# Patient Record
Sex: FEMALE | Race: OTHER | NOT HISPANIC OR LATINO
[De-identification: names, ages, dates, MRNs, and addresses within clinical notes are randomized per-mention and may not be internally consistent; named-entity substitution may affect disease eponyms.]

---

## 2018-04-30 PROBLEM — Z00.00 ENCOUNTER FOR PREVENTIVE HEALTH EXAMINATION: Status: ACTIVE | Noted: 2018-04-30

## 2018-05-18 ENCOUNTER — APPOINTMENT (OUTPATIENT)
Dept: NEUROSURGERY | Facility: CLINIC | Age: 53
End: 2018-05-18
Payer: MEDICARE

## 2018-05-18 VITALS
BODY MASS INDEX: 29.09 KG/M2 | SYSTOLIC BLOOD PRESSURE: 96 MMHG | WEIGHT: 181 LBS | OXYGEN SATURATION: 98 % | HEIGHT: 66 IN | HEART RATE: 73 BPM | DIASTOLIC BLOOD PRESSURE: 66 MMHG | TEMPERATURE: 97.8 F

## 2018-05-18 DIAGNOSIS — Z82.49 FAMILY HISTORY OF ISCHEMIC HEART DISEASE AND OTHER DISEASES OF THE CIRCULATORY SYSTEM: ICD-10-CM

## 2018-05-18 DIAGNOSIS — Z78.9 OTHER SPECIFIED HEALTH STATUS: ICD-10-CM

## 2018-05-18 DIAGNOSIS — E22.9 HYPERFUNCTION OF PITUITARY GLAND, UNSPECIFIED: ICD-10-CM

## 2018-05-18 DIAGNOSIS — J30.2 OTHER SEASONAL ALLERGIC RHINITIS: ICD-10-CM

## 2018-05-18 DIAGNOSIS — F31.9 BIPOLAR DISORDER, UNSPECIFIED: ICD-10-CM

## 2018-05-18 DIAGNOSIS — G91.9 HYDROCEPHALUS, UNSPECIFIED: ICD-10-CM

## 2018-05-18 DIAGNOSIS — Z82.61 FAMILY HISTORY OF ARTHRITIS: ICD-10-CM

## 2018-05-18 PROCEDURE — 99204 OFFICE O/P NEW MOD 45 MIN: CPT

## 2018-05-18 RX ORDER — PALIPERIDONE PALMITATE 117 MG/.75ML
117 INJECTION INTRAMUSCULAR
Refills: 0 | Status: ACTIVE | COMMUNITY

## 2018-05-18 RX ORDER — FLUTICASONE FUROATE AND VILANTEROL TRIFENATATE 100; 25 UG/1; UG/1
100-25 POWDER RESPIRATORY (INHALATION)
Refills: 0 | Status: ACTIVE | COMMUNITY

## 2018-05-18 RX ORDER — LISINOPRIL AND HYDROCHLOROTHIAZIDE TABLETS 20; 25 MG/1; MG/1
20-25 TABLET ORAL
Refills: 0 | Status: ACTIVE | COMMUNITY

## 2018-07-23 PROBLEM — J30.2 SEASONAL ALLERGIES: Status: ACTIVE | Noted: 2018-05-18

## 2018-08-13 ENCOUNTER — APPOINTMENT (OUTPATIENT)
Dept: NEUROSURGERY | Facility: CLINIC | Age: 53
End: 2018-08-13
Payer: MEDICARE

## 2018-08-13 VITALS
TEMPERATURE: 98.4 F | SYSTOLIC BLOOD PRESSURE: 99 MMHG | OXYGEN SATURATION: 98 % | RESPIRATION RATE: 18 BRPM | HEIGHT: 66 IN | BODY MASS INDEX: 28.93 KG/M2 | HEART RATE: 66 BPM | WEIGHT: 180 LBS | DIASTOLIC BLOOD PRESSURE: 64 MMHG

## 2018-08-13 PROCEDURE — 99214 OFFICE O/P EST MOD 30 MIN: CPT

## 2019-08-09 ENCOUNTER — APPOINTMENT (OUTPATIENT)
Dept: NEUROSURGERY | Facility: CLINIC | Age: 54
End: 2019-08-09

## 2019-09-12 NOTE — PHYSICAL EXAM
[General Appearance - Alert] : alert [General Appearance - In No Acute Distress] : in no acute distress [Person] : oriented to person [Place] : oriented to place [Time] : oriented to time [Cranial Nerves Optic (II)] : visual acuity intact bilaterally,  pupils equal round and reactive to light [Cranial Nerves Oculomotor (III)] : extraocular motion intact [Cranial Nerves Trigeminal (V)] : facial sensation intact symmetrically [Cranial Nerves Facial (VII)] : face symmetrical [Cranial Nerves Vestibulocochlear (VIII)] : hearing was intact bilaterally [Cranial Nerves Glossopharyngeal (IX)] : tongue and palate midline [Cranial Nerves Accessory (XI - Cranial And Spinal)] : head turning and shoulder shrug symmetric [Cranial Nerves Hypoglossal (XII)] : there was no tongue deviation with protrusion [Motor Tone] : muscle tone was normal in all four extremities [Motor Strength] : muscle strength was normal in all four extremities [Balance] : balance was intact [Extraocular Movements] : extraocular movements were intact [Full Visual Field] : full visual field [Neck Appearance] : the appearance of the neck was normal [] : no respiratory distress [Abnormal Walk] : normal gait

## 2019-09-13 ENCOUNTER — APPOINTMENT (OUTPATIENT)
Dept: NEUROSURGERY | Facility: CLINIC | Age: 54
End: 2019-09-13
Payer: MEDICARE

## 2019-09-13 ENCOUNTER — APPOINTMENT (OUTPATIENT)
Dept: MRI IMAGING | Facility: HOSPITAL | Age: 54
End: 2019-09-13
Payer: MEDICARE

## 2019-09-13 ENCOUNTER — OUTPATIENT (OUTPATIENT)
Dept: OUTPATIENT SERVICES | Facility: HOSPITAL | Age: 54
LOS: 1 days | End: 2019-09-13
Payer: MEDICARE

## 2019-09-13 VITALS
WEIGHT: 180 LBS | TEMPERATURE: 98 F | DIASTOLIC BLOOD PRESSURE: 78 MMHG | OXYGEN SATURATION: 99 % | SYSTOLIC BLOOD PRESSURE: 125 MMHG | RESPIRATION RATE: 18 BRPM | HEIGHT: 66 IN | BODY MASS INDEX: 28.93 KG/M2 | HEART RATE: 67 BPM

## 2019-09-13 PROCEDURE — 99214 OFFICE O/P EST MOD 30 MIN: CPT

## 2019-09-13 PROCEDURE — 70553 MRI BRAIN STEM W/O & W/DYE: CPT | Mod: 26

## 2019-09-13 PROCEDURE — 70553 MRI BRAIN STEM W/O & W/DYE: CPT

## 2019-09-13 PROCEDURE — A9585: CPT

## 2019-09-13 RX ORDER — ASPIRIN 81 MG
81 TABLET, DELAYED RELEASE (ENTERIC COATED) ORAL
Refills: 0 | Status: ACTIVE | COMMUNITY

## 2019-09-13 RX ORDER — BROMOCRIPTINE MESYLATE 2.5 MG/1
2.5 TABLET ORAL
Refills: 0 | Status: DISCONTINUED | COMMUNITY
End: 2019-09-13

## 2019-09-13 RX ORDER — CALCIUM CARBONATE 260MG(650)
500 TABLET,CHEWABLE ORAL
Refills: 0 | Status: ACTIVE | COMMUNITY

## 2019-09-13 RX ORDER — FERROUS GLUCONATE 240(27)MG
TABLET ORAL
Refills: 0 | Status: ACTIVE | COMMUNITY

## 2019-09-13 RX ORDER — DILTIAZEM HYDROCHLORIDE 60 MG/1
60 TABLET ORAL
Refills: 0 | Status: ACTIVE | COMMUNITY

## 2019-09-13 NOTE — HISTORY OF PRESENT ILLNESS
[FreeTextEntry1] : elevated prolactin [de-identified] : \par She reports she had complaints of dizziness, blurred vision, migraines. SHe went to her PCP who ordered labs and noticed an elevated PRL.  An MRI was then ordered that revealed a possible adenoma.\par \par She is on carbergoline 0.5mg three times weekly.  Her last prolactin was 23 7/2019\par \par Presents to review her imaging and discuss a treatment/follow up plan.

## 2019-09-13 NOTE — ASSESSMENT
[FreeTextEntry1] : Patient is a follow up case of pituitary adenoma on dopamine agonists. There is no significant increase in size of her tumor on serial MRI imaging. Patient has been counselled regarding the role of medical management in her disease considering the stable course of her tumor in absence of any active symptoms. She has also been advised to report back urgently in case she notices any symptoms of vision deterioration, persistent headache etc. She should continue to follow with her endocrinologist Dr. Hinton and her ophthalmologist Dr. Oleary.  I had a long discussion with the patient regarding the role of serial imaging with a new MRI in Fall 2020.  If the tumor grows or becomes symptomatic, we plan for surgical excision accordingly. Therapeutic and diagnostic tests include MRI sella in Fall 2020.  The patient should see me back in Fall 2020. I have explained the alternatives, risks and benefits to the patient and he understands and agrees to proceed. \par \par

## 2019-09-13 NOTE — DATA REVIEWED
[de-identified] : I have reviewed the most recent MRI which shows stability of a cystic pituitary lesion

## 2021-01-15 ENCOUNTER — APPOINTMENT (OUTPATIENT)
Dept: NEUROSURGERY | Facility: CLINIC | Age: 56
End: 2021-01-15
Payer: MEDICARE

## 2021-01-15 ENCOUNTER — OUTPATIENT (OUTPATIENT)
Dept: OUTPATIENT SERVICES | Facility: HOSPITAL | Age: 56
LOS: 1 days | End: 2021-01-15
Payer: MEDICARE

## 2021-01-15 ENCOUNTER — APPOINTMENT (OUTPATIENT)
Dept: MRI IMAGING | Facility: HOSPITAL | Age: 56
End: 2021-01-15

## 2021-01-15 VITALS
DIASTOLIC BLOOD PRESSURE: 85 MMHG | BODY MASS INDEX: 32.14 KG/M2 | RESPIRATION RATE: 18 BRPM | TEMPERATURE: 94.4 F | HEART RATE: 87 BPM | WEIGHT: 200 LBS | SYSTOLIC BLOOD PRESSURE: 145 MMHG | OXYGEN SATURATION: 98 % | HEIGHT: 66 IN

## 2021-01-15 PROCEDURE — A9585: CPT

## 2021-01-15 PROCEDURE — 70553 MRI BRAIN STEM W/O & W/DYE: CPT

## 2021-01-15 PROCEDURE — 70553 MRI BRAIN STEM W/O & W/DYE: CPT | Mod: 26

## 2021-01-15 PROCEDURE — 99214 OFFICE O/P EST MOD 30 MIN: CPT

## 2021-01-15 PROCEDURE — 99072 ADDL SUPL MATRL&STAF TM PHE: CPT

## 2021-01-15 RX ORDER — TOPIRAMATE 25 MG/1
25 TABLET, FILM COATED ORAL
Refills: 0 | Status: ACTIVE | COMMUNITY

## 2021-01-15 NOTE — PHYSICAL EXAM
[General Appearance - Alert] : alert [General Appearance - In No Acute Distress] : in no acute distress [Person] : oriented to person [Place] : oriented to place [Time] : oriented to time [Cranial Nerves Optic (II)] : visual acuity intact bilaterally,  pupils equal round and reactive to light [Cranial Nerves Oculomotor (III)] : extraocular motion intact [Cranial Nerves Trigeminal (V)] : facial sensation intact symmetrically [Cranial Nerves Facial (VII)] : face symmetrical [Cranial Nerves Vestibulocochlear (VIII)] : hearing was intact bilaterally [Cranial Nerves Glossopharyngeal (IX)] : tongue and palate midline [Cranial Nerves Accessory (XI - Cranial And Spinal)] : head turning and shoulder shrug symmetric [Cranial Nerves Hypoglossal (XII)] : there was no tongue deviation with protrusion [Motor Tone] : muscle tone was normal in all four extremities [Motor Strength] : muscle strength was normal in all four extremities [Abnormal Walk] : normal gait [Balance] : balance was intact [PERRL With Normal Accommodation] : pupils were equal in size, round, reactive to light, with normal accommodation [Extraocular Movements] : extraocular movements were intact [Full Visual Field] : full visual field [Outer Ear] : the ears and nose were normal in appearance [Neck Appearance] : the appearance of the neck was normal [] : no respiratory distress

## 2021-01-21 NOTE — HISTORY OF PRESENT ILLNESS
[> 3 months] : more  than 3 months [de-identified] : \par She initially presented with complaints of dizziness, blurred vision, migraines. SHe went to her PCP who ordered labs and noticed an elevated PRL.  An MRI was then ordered that revealed a possible adenoma.\par \par She is on carbergoline 0.5mg three times weekly.  Per Dr. Hinton's note her last prolactin was 39.5.\par  Her PRL was as high as 115.32 on 9/18/2017\par \par Presents to review her imaging and discuss a treatment/follow up plan. She has had some headaches lately which have been treated with topamax.

## 2021-01-21 NOTE — ASSESSMENT
[FreeTextEntry1] : Patient is a follow up case of pituitary adenoma on dopamine agonists. There is no significant increase in size of her tumor on serial MRI imaging. Patient has been counselled regarding the role of medical management in her disease considering the stable course of her tumor in absence of any active symptoms. She has also been advised to report back urgently in case she notices any symptoms of vision deterioration, persistent headache etc. She should continue to follow with her endocrinologist Dr. Hinton and her ophthalmologist Dr. Oleary.  I had a long discussion with the patient regarding the role of serial imaging with a new MRI in January 2022.  .  The patient should see me back in January 2022. I have explained the alternatives, risks and benefits to the patient and she understands and agrees to proceed. \par \par

## 2021-08-13 ENCOUNTER — APPOINTMENT (OUTPATIENT)
Dept: SURGERY | Facility: CLINIC | Age: 56
End: 2021-08-13

## 2022-07-22 ENCOUNTER — APPOINTMENT (OUTPATIENT)
Dept: MRI IMAGING | Facility: HOSPITAL | Age: 57
End: 2022-07-22

## 2022-08-08 ENCOUNTER — APPOINTMENT (OUTPATIENT)
Dept: NEUROSURGERY | Facility: CLINIC | Age: 57
End: 2022-08-08

## 2022-08-23 ENCOUNTER — TRANSCRIPTION ENCOUNTER (OUTPATIENT)
Age: 57
End: 2022-08-23

## 2022-08-23 ENCOUNTER — APPOINTMENT (OUTPATIENT)
Dept: MRI IMAGING | Facility: HOSPITAL | Age: 57
End: 2022-08-23

## 2022-08-23 ENCOUNTER — OUTPATIENT (OUTPATIENT)
Dept: OUTPATIENT SERVICES | Facility: HOSPITAL | Age: 57
LOS: 1 days | End: 2022-08-23
Payer: MEDICARE

## 2022-08-23 PROCEDURE — 70553 MRI BRAIN STEM W/O & W/DYE: CPT | Mod: 26

## 2022-08-23 PROCEDURE — 70553 MRI BRAIN STEM W/O & W/DYE: CPT

## 2022-08-23 PROCEDURE — A9585: CPT

## 2022-08-29 ENCOUNTER — APPOINTMENT (OUTPATIENT)
Dept: NEUROSURGERY | Facility: CLINIC | Age: 57
End: 2022-08-29

## 2022-08-29 PROCEDURE — 99205 OFFICE O/P NEW HI 60 MIN: CPT | Mod: 95

## 2022-08-29 NOTE — ASSESSMENT
[FreeTextEntry1] : Patient is a follow up case of pituitary adenoma on dopamine agonists. There is no significant increase in size of her tumor on serial MRI imaging when compared to last year, there may be slight growth compared to 2019. Patient has been counselled regarding the role of medical management in her disease considering the stable course of her tumor in absence of any active symptoms. Her prolactin levels may be falsely elevated due to trazodone uses. She has also been advised to report back urgently in case she notices any symptoms of vision deterioration, persistent headache etc. She should continue to follow with her endocrinologist Dr. Hinton and her ophthalmologist Dr. Oleary.  I had a long discussion with the patient regarding the role of serial imaging with a new MRI sella with and without contrast in April 2023 and with new endocrine labs at that time.  The patient should see me back in April 2023. I have explained the alternatives, risks and benefits to the patient and she understands and agrees to proceed. \par \par

## 2022-08-29 NOTE — REASON FOR VISIT
[Follow-Up: _____] : a [unfilled] follow-up visit [Home] : at home, [unfilled] , at the time of the visit. [Medical Office: ([facility])___] : at the medical office located in  [FreeTextEntry1] : new MRI

## 2022-08-29 NOTE — DATA REVIEWED
[de-identified] : I have reviewed the most recent MRI 8/23/22 at [facility] which shows no significant change to the size of her known pituitary adenoma when compared to 2021.

## 2022-08-29 NOTE — HISTORY OF PRESENT ILLNESS
[> 3 months] : more  than 3 months [de-identified] : \par She initially presented with complaints of dizziness, blurred vision, migraines. SHe went to her PCP who ordered labs and noticed an elevated PRL.  An MRI was then ordered that revealed a possible adenoma.\par \par She is on carbergoline 0.5mg three times weekly.  Per Dr. Hinton's note her last prolactin was 82.\par  Her PRL was as high as 115.32 on 9/18/2017\par \par Presents to review her imaging and discuss a treatment/follow up plan. She has had some headaches lately which have been treated with topamax. \par She denies any leaking from her breasts. \par She was recently started on Trazodone for her bipolar disorder.

## 2023-04-14 ENCOUNTER — APPOINTMENT (OUTPATIENT)
Dept: MRI IMAGING | Facility: HOSPITAL | Age: 58
End: 2023-04-14
Payer: MEDICARE

## 2023-04-14 ENCOUNTER — APPOINTMENT (OUTPATIENT)
Dept: NEUROSURGERY | Facility: CLINIC | Age: 58
End: 2023-04-14
Payer: MEDICARE

## 2023-04-14 ENCOUNTER — OUTPATIENT (OUTPATIENT)
Dept: OUTPATIENT SERVICES | Facility: HOSPITAL | Age: 58
LOS: 1 days | End: 2023-04-14
Payer: MEDICARE

## 2023-04-14 ENCOUNTER — NON-APPOINTMENT (OUTPATIENT)
Age: 58
End: 2023-04-14

## 2023-04-14 ENCOUNTER — RESULT REVIEW (OUTPATIENT)
Age: 58
End: 2023-04-14

## 2023-04-14 VITALS
SYSTOLIC BLOOD PRESSURE: 94 MMHG | RESPIRATION RATE: 17 BRPM | DIASTOLIC BLOOD PRESSURE: 62 MMHG | OXYGEN SATURATION: 99 % | BODY MASS INDEX: 29.41 KG/M2 | HEIGHT: 66 IN | TEMPERATURE: 96.7 F | WEIGHT: 183 LBS | HEART RATE: 75 BPM

## 2023-04-14 PROCEDURE — 70553 MRI BRAIN STEM W/O & W/DYE: CPT

## 2023-04-14 PROCEDURE — A9585: CPT

## 2023-04-14 PROCEDURE — 70553 MRI BRAIN STEM W/O & W/DYE: CPT | Mod: 26

## 2023-04-17 ENCOUNTER — APPOINTMENT (OUTPATIENT)
Dept: NEUROSURGERY | Facility: CLINIC | Age: 58
End: 2023-04-17
Payer: MEDICARE

## 2023-04-17 PROCEDURE — 99214 OFFICE O/P EST MOD 30 MIN: CPT | Mod: 95

## 2023-04-17 NOTE — ASSESSMENT
[FreeTextEntry1] : Patient is a follow up case of pituitary adenoma on dopamine agonists. There is no significant increase in size of her tumor on serial MRI imaging when compared to last year, there may be slight growth compared to 2019. Patient has been counselled regarding the role of medical management in her disease considering the stable course of her tumor in absence of any active symptoms. Her prolactin levels may be falsely elevated due to trazodone uses. She has also been advised to report back urgently in case she notices any symptoms of vision deterioration, persistent headache etc. She should continue to follow with her endocrinologist Dr. Hinton and her ophthalmologist Dr. Oleary.  I had a long discussion with the patient regarding the role of serial imaging with a new MRI sella with and without contrast in April 2024 and with new endocrine labs at that time.  The patient should see me back in April 2024. I have explained the alternatives, risks and benefits to the patient and she understands and agrees to proceed. \par \par

## 2023-04-17 NOTE — HISTORY OF PRESENT ILLNESS
[de-identified] : \par She initially presented with complaints of dizziness, blurred vision, migraines. SHe went to her PCP who ordered labs and noticed an elevated PRL.  An MRI was then ordered that revealed a possible adenoma.\par \par She is on cabergoline 0.5mg three times weekly.  Per Dr. Hinton's note her last prolactin was 82.\par  Her PRL was as high as 115.32 on 9/18/2017\par \par Presents to review her imaging and discuss a treatment/follow up plan. She has had some headaches lately which have been treated with topamax. \par She denies any leaking from her breasts. \par She was started on Trazodone for her bipolar disorder. \par \par Recent labs 3/3/23\par PRL 46.5

## 2023-04-17 NOTE — REASON FOR VISIT
[Home] : at home, [unfilled] , at the time of the visit. [Medical Office: ([facility])___] : at the medical office located in  [Patient] : the patient [FreeTextEntry1] : new MRI

## 2023-04-17 NOTE — DATA REVIEWED
[de-identified] : I have reviewed the most recent MRI 4/14/23 at [facility] which shows no evidence of recurrent or residual adenoma

## 2024-06-11 ENCOUNTER — APPOINTMENT (OUTPATIENT)
Dept: MRI IMAGING | Facility: HOSPITAL | Age: 59
End: 2024-06-11

## 2024-06-11 ENCOUNTER — OUTPATIENT (OUTPATIENT)
Dept: OUTPATIENT SERVICES | Facility: HOSPITAL | Age: 59
LOS: 1 days | End: 2024-06-11
Payer: MEDICARE

## 2024-06-11 PROCEDURE — 70553 MRI BRAIN STEM W/O & W/DYE: CPT

## 2024-06-11 PROCEDURE — A9585: CPT

## 2024-06-11 PROCEDURE — 70553 MRI BRAIN STEM W/O & W/DYE: CPT | Mod: 26

## 2024-06-14 ENCOUNTER — APPOINTMENT (OUTPATIENT)
Dept: NEUROSURGERY | Facility: CLINIC | Age: 59
End: 2024-06-14
Payer: MEDICARE

## 2024-06-14 VITALS
HEIGHT: 66 IN | DIASTOLIC BLOOD PRESSURE: 74 MMHG | HEART RATE: 71 BPM | SYSTOLIC BLOOD PRESSURE: 109 MMHG | BODY MASS INDEX: 29.41 KG/M2 | RESPIRATION RATE: 18 BRPM | OXYGEN SATURATION: 98 % | WEIGHT: 183 LBS

## 2024-06-14 DIAGNOSIS — Z98.2 PRESENCE OF CEREBROSPINAL FLUID DRAINAGE DEVICE: ICD-10-CM

## 2024-06-14 DIAGNOSIS — D35.2 BENIGN NEOPLASM OF PITUITARY GLAND: ICD-10-CM

## 2024-06-14 PROCEDURE — 99213 OFFICE O/P EST LOW 20 MIN: CPT

## 2024-06-16 PROBLEM — D35.2 PITUITARY ADENOMA: Status: ACTIVE | Noted: 2018-05-18

## 2024-06-16 PROBLEM — Z98.2 S/P VP SHUNT: Status: ACTIVE | Noted: 2018-05-18

## 2024-06-16 RX ORDER — MONTELUKAST 10 MG/1
10 TABLET, FILM COATED ORAL
Refills: 0 | Status: ACTIVE | COMMUNITY

## 2024-06-16 RX ORDER — IRON HEME POLYPEPTIDE/FOLIC AC 12-1MG
125 MCG TABLET ORAL
Refills: 0 | Status: DISCONTINUED | COMMUNITY
End: 2024-06-16

## 2024-06-16 RX ORDER — RIFAXIMIN 550 MG/1
550 TABLET ORAL
Refills: 0 | Status: ACTIVE | COMMUNITY

## 2024-06-16 RX ORDER — TRAZODONE HYDROCHLORIDE 50 MG/1
50 TABLET ORAL
Refills: 0 | Status: ACTIVE | COMMUNITY

## 2024-06-16 RX ORDER — ICOSAPENT ETHYL 1000 MG/1
1 CAPSULE ORAL
Refills: 0 | Status: ACTIVE | COMMUNITY

## 2024-06-16 RX ORDER — CYANOCOBALAMIN 1000 UG/ML
1000 INJECTION INTRAMUSCULAR; SUBCUTANEOUS
Refills: 0 | Status: ACTIVE | COMMUNITY

## 2024-06-16 RX ORDER — ASCORBIC ACID 500 MG
500 TABLET ORAL
Refills: 0 | Status: ACTIVE | COMMUNITY

## 2024-06-16 RX ORDER — MECLIZINE HYDROCHLORIDE 25 MG/1
25 TABLET ORAL
Refills: 0 | Status: DISCONTINUED | COMMUNITY
End: 2024-06-16

## 2024-06-16 RX ORDER — DENOSUMAB 60 MG/ML
60 INJECTION SUBCUTANEOUS
Refills: 0 | Status: ACTIVE | COMMUNITY

## 2024-06-16 RX ORDER — CALCIUM CARBONATE 260MG(650)
500 TABLET,CHEWABLE ORAL
Refills: 0 | Status: ACTIVE | COMMUNITY

## 2024-06-16 RX ORDER — ALBUTEROL 90 MCG
90 AEROSOL (GRAM) INHALATION
Refills: 0 | Status: DISCONTINUED | COMMUNITY
End: 2024-06-16

## 2024-06-16 RX ORDER — LACTULOSE 10 G/15ML
10 SOLUTION ORAL
Refills: 0 | Status: ACTIVE | COMMUNITY

## 2024-06-16 RX ORDER — SPIRONOLACTONE 25 MG/1
25 TABLET ORAL
Refills: 0 | Status: ACTIVE | COMMUNITY

## 2024-06-16 RX ORDER — B-COMPLEX WITH VITAMIN C
TABLET ORAL
Refills: 0 | Status: DISCONTINUED | COMMUNITY
End: 2024-06-16

## 2024-06-16 RX ORDER — IBANDRONATE SODIUM 150 MG/1
150 TABLET ORAL
Refills: 0 | Status: ACTIVE | COMMUNITY

## 2024-06-16 RX ORDER — CABERGOLINE 0.5 MG/1
0.5 TABLET ORAL 3 TIMES DAILY
Refills: 0 | Status: ACTIVE | COMMUNITY

## 2024-06-16 RX ORDER — DIVALPROEX SODIUM 250 MG/1
250 TABLET, DELAYED RELEASE ORAL
Refills: 0 | Status: DISCONTINUED | COMMUNITY
End: 2024-06-16

## 2024-06-16 RX ORDER — ALBUTEROL 90 MCG
90 AEROSOL (GRAM) INHALATION
Refills: 0 | Status: ACTIVE | COMMUNITY

## 2024-06-16 RX ORDER — CHOLECALCIFEROL (VITAMIN D3) 125 MCG
TABLET ORAL
Refills: 0 | Status: ACTIVE | COMMUNITY

## 2024-06-16 RX ORDER — TRAZODONE HYDROCHLORIDE 100 MG/1
100 TABLET ORAL
Refills: 0 | Status: DISCONTINUED | COMMUNITY
End: 2024-06-16

## 2024-06-16 RX ORDER — RIMEGEPANT SULFATE 75 MG/75MG
75 TABLET, ORALLY DISINTEGRATING ORAL
Refills: 0 | Status: ACTIVE | COMMUNITY

## 2024-06-16 RX ORDER — ATORVASTATIN CALCIUM 20 MG/1
20 TABLET, FILM COATED ORAL
Refills: 0 | Status: ACTIVE | COMMUNITY

## 2024-06-16 RX ORDER — HYDROXYCHLOROQUINE SULFATE 200 MG/1
200 TABLET, FILM COATED ORAL
Refills: 0 | Status: ACTIVE | COMMUNITY

## 2024-06-16 NOTE — DATA REVIEWED
[de-identified] : I have reviewed the most recent MRI on 6/11/24 at [facility] which shows Area of decreased enhancement involving the left inferior aspect of the pituitary gland again seen. This best seen on series 601 image 7 and measures approximately 5.4 x 5.1 mm.

## 2024-06-16 NOTE — PHYSICAL EXAM
[General Appearance - Alert] : alert [General Appearance - In No Acute Distress] : in no acute distress [Oriented To Time, Place, And Person] : oriented to person, place, and time [Person] : oriented to person [Place] : oriented to place [Time] : oriented to time [Cranial Nerves Optic (II)] : visual acuity intact bilaterally,  pupils equal round and reactive to light [Cranial Nerves Oculomotor (III)] : extraocular motion intact [Cranial Nerves Trigeminal (V)] : facial sensation intact symmetrically [Cranial Nerves Facial (VII)] : face symmetrical [Cranial Nerves Vestibulocochlear (VIII)] : hearing was intact bilaterally [Cranial Nerves Glossopharyngeal (IX)] : tongue and palate midline [Cranial Nerves Accessory (XI - Cranial And Spinal)] : head turning and shoulder shrug symmetric [Cranial Nerves Hypoglossal (XII)] : there was no tongue deviation with protrusion [FreeTextEntry1] : walks with cane as of 2009 metal yanna in Right leg

## 2024-06-16 NOTE — HISTORY OF PRESENT ILLNESS
[> 3 months] : more  than 3 months [de-identified] : DANILO NAVARRO is a female with hx of congenital hydrocephalus with  shunt placed 1993 by  at [facility]. She initially presented with complaints of dizziness, blurred vision, migraines. She went to her PCP who ordered labs and noticed an elevated PRL.  An MRI was then ordered that revealed a possible adenoma. DIagnised with Prolactinoma.  She is on cabergoline 0.5mg three times weekly.  Per Dr. Hinton's note her last prolactin was 82.  Her PRL was as high as 115.32 on 9/18/2017  She was started on Trazodone for her bipolar disorder.   Recent labs 3/3/23 PRL 46.5  Labs 5/9/24 PRL  36.1 on cabergoline 0.5mg   PCP Dr.Natalia Toro Neurologist:  on [facility] Endocrinology: Dr. Shahid Hinton  TODAY 6/14/24- presents for MRI review. Awaiting labwork from Dr.Jaldeep Hinton office for recent prolactin level. Last ophtho visit: [FreeTextEntry1] : DANILO NAVARRO is a female with hx of congenital hydrocephalus with  shunt placed 1993 by  at [facility]. She initially presented with complaints of dizziness, blurred vision, migraines. She went to her PCP who ordered labs and noticed an elevated PRL.  An MRI was then ordered that revealed a possible adenoma. DIagnised with Prolactinoma.  She is on cabergoline 0.5mg three times weekly.  Per Dr. Hinton's note her last prolactin was 82.  Her PRL was as high as 115.32 on 9/18/2017  She was started on Trazodone for her bipolar disorder.   Recent labs 3/3/23 PRL 46.5  Labs 5/9/24 PRL  36.1 on cabergoline 0.5mg    TODAY 6/14/24- presents for MRI review.  Denies abnormal growth of hands/feet, breast leaking, abnormal growth of facial features, significant unexplained weight loss/gain, sudden or peripheral vision loss and abnormal hair growth/loss, excessive thirst or urination, or excessive fatigue. Denies headache, changes in speech, nausea/vomiting, numbness/tingling/weakness in extremities, abnormal movement of extremities. Patient walks with a cane for support had right leg metal yanna 2009.   PCP Dr.Natalia Toro Neurologist:  on [facility] Endocrinology: Dr. Shahid Hinton

## 2024-06-16 NOTE — ASSESSMENT
[FreeTextEntry1] :  My impression is that the patient suffers from prolactinoma that is stable.   I had a long discussion with the patient regarding the role of continued surveillance.  The patient was extensively educated about the nature of her disease process. Therapeutic and diagnostic tests include MRI Sella w/wo IV in 6/2025.  The patient should see endo.  I will see the patient back after her next MRI with MRI and labwork. I have explained the alternatives, risks and benefits to the patient andshe understands and agrees to proceed.

## 2024-07-19 ENCOUNTER — TRANSCRIPTION ENCOUNTER (OUTPATIENT)
Age: 59
End: 2024-07-19

## 2025-06-10 ENCOUNTER — OUTPATIENT (OUTPATIENT)
Dept: OUTPATIENT SERVICES | Facility: HOSPITAL | Age: 60
LOS: 1 days | End: 2025-06-10
Payer: MEDICARE

## 2025-06-10 ENCOUNTER — APPOINTMENT (OUTPATIENT)
Dept: MRI IMAGING | Facility: HOSPITAL | Age: 60
End: 2025-06-10

## 2025-06-10 PROCEDURE — 70553 MRI BRAIN STEM W/O & W/DYE: CPT | Mod: 26

## 2025-06-10 PROCEDURE — 70553 MRI BRAIN STEM W/O & W/DYE: CPT

## 2025-06-10 PROCEDURE — A9585: CPT

## 2025-07-07 PROBLEM — R79.89 ELEVATED PROLACTIN LEVEL: Status: ACTIVE | Noted: 2018-05-18

## 2025-07-14 ENCOUNTER — APPOINTMENT (OUTPATIENT)
Dept: NEUROSURGERY | Facility: CLINIC | Age: 60
End: 2025-07-14
Payer: MEDICARE

## 2025-07-14 PROCEDURE — 99213 OFFICE O/P EST LOW 20 MIN: CPT | Mod: 2W

## 2025-08-18 ENCOUNTER — APPOINTMENT (OUTPATIENT)
Dept: MRI IMAGING | Facility: HOSPITAL | Age: 60
End: 2025-08-18